# Patient Record
Sex: FEMALE | Race: BLACK OR AFRICAN AMERICAN | NOT HISPANIC OR LATINO | Employment: FULL TIME | URBAN - METROPOLITAN AREA
[De-identification: names, ages, dates, MRNs, and addresses within clinical notes are randomized per-mention and may not be internally consistent; named-entity substitution may affect disease eponyms.]

---

## 2022-10-19 ENCOUNTER — OFFICE VISIT (OUTPATIENT)
Dept: OPHTHALMOLOGY | Facility: CLINIC | Age: 47
End: 2022-10-19
Attending: STUDENT IN AN ORGANIZED HEALTH CARE EDUCATION/TRAINING PROGRAM
Payer: COMMERCIAL

## 2022-10-19 DIAGNOSIS — H43.393 VITREOUS SYNERESIS OF BOTH EYES: Primary | ICD-10-CM

## 2022-10-19 DIAGNOSIS — H35.412 LATTICE DEGENERATION, LEFT EYE: ICD-10-CM

## 2022-10-19 DIAGNOSIS — J30.2 SEASONAL ALLERGIES: ICD-10-CM

## 2022-10-19 DIAGNOSIS — H52.03 HYPEROPIA WITH PRESBYOPIA OF BOTH EYES: ICD-10-CM

## 2022-10-19 DIAGNOSIS — H52.4 HYPEROPIA WITH PRESBYOPIA OF BOTH EYES: ICD-10-CM

## 2022-10-19 DIAGNOSIS — H35.81 COTTON WOOL SPOTS: ICD-10-CM

## 2022-10-19 PROCEDURE — G0463 HOSPITAL OUTPT CLINIC VISIT: HCPCS

## 2022-10-19 PROCEDURE — 92004 COMPRE OPH EXAM NEW PT 1/>: CPT | Performed by: STUDENT IN AN ORGANIZED HEALTH CARE EDUCATION/TRAINING PROGRAM

## 2022-10-19 PROCEDURE — 92015 DETERMINE REFRACTIVE STATE: CPT

## 2022-10-19 ASSESSMENT — CONF VISUAL FIELD
OD_SUPERIOR_TEMPORAL_RESTRICTION: 0
OD_NORMAL: 1
OS_NORMAL: 1
OS_INFERIOR_TEMPORAL_RESTRICTION: 0
OS_SUPERIOR_NASAL_RESTRICTION: 0
METHOD: COUNTING FINGERS
OD_INFERIOR_NASAL_RESTRICTION: 0
OS_SUPERIOR_TEMPORAL_RESTRICTION: 0
OD_SUPERIOR_NASAL_RESTRICTION: 0
OD_INFERIOR_TEMPORAL_RESTRICTION: 0
OS_INFERIOR_NASAL_RESTRICTION: 0

## 2022-10-19 ASSESSMENT — EXTERNAL EXAM - LEFT EYE: OS_EXAM: WNL

## 2022-10-19 ASSESSMENT — VISUAL ACUITY
OD_SC: 20/50
OS_PH_SC: 20/40
METHOD: SNELLEN - LINEAR
OD_PH_SC: 20/30
OD_PH_SC+: -1
OS_PH_SC+: +2
OS_SC: 20/70
OD_SC+: +2

## 2022-10-19 ASSESSMENT — SLIT LAMP EXAM - LIDS
COMMENTS: WNL
COMMENTS: WNL

## 2022-10-19 ASSESSMENT — TONOMETRY
OS_IOP_MMHG: 19
OD_IOP_MMHG: 20
IOP_METHOD: TONOPEN

## 2022-10-19 ASSESSMENT — EXTERNAL EXAM - RIGHT EYE: OD_EXAM: WNL

## 2022-10-19 ASSESSMENT — REFRACTION_MANIFEST
OD_SPHERE: +1.00
OS_CYLINDER: SPHERE
OS_ADD: +1.25
OD_CYLINDER: SPHERE
OS_SPHERE: +1.50
OD_ADD: +1.25

## 2022-10-19 ASSESSMENT — CUP TO DISC RATIO
OD_RATIO: 0.45
OS_RATIO: 0.3

## 2022-10-19 NOTE — NURSING NOTE
Chief Complaints and History of Present Illnesses   Patient presents with     Annual Eye Exam     Chief Complaint(s) and History of Present Illness(es)     Annual Eye Exam           Comments    Pt C/o blurry vision distance and near  Pt states she has had flashes and floaters for years  No eye pain or redness    Luna Hernandez COT 7:30 AM October 19, 2022

## 2022-10-19 NOTE — PROGRESS NOTES
"HPI    Pt C/o blurry vision distance and near  Pt states she has had flashes and floaters for years  No eye pain or redness    Luna Hernandez COT 7:30 AM October 19, 2022       Last edited by Luna Hernandez on 10/19/2022  7:30 AM.          Review of systems for the eyes was negative other than the pertinent positives/negatives listed in the HPI.    Ocular Meds: none    Ocular Hx: none    FOHx: no family history of glaucoma or blindness    PMHx: no T2DM    Assessment & Plan      Lashaun Gamboa is a 47 year old female with the following diagnoses:    1. Vitreous syneresis of both eyes    2. Lattice degeneration, left eye    3. Seasonal allergies    4. Cotton wool spots    5. Hyperopia with presbyopia of both eyes       Vitreous Syneresis of both eyes  Lattice Degeneration left eye  - no holes, tears, or detachment, janelle's negative OU  - counseled RD precautions    Seasonal Allergies  - seasonal allergies and intermittent itchiness of eyes;   - will Rx Ketotifen BID PRN itching    Cotton Wool Spot OD  - history of hypertension; no headaches or change in vision; notes BP being \"high\" several months ago; will notify patient's PCP if there is more work up needed to be pursued  - advised to routinely check BP at home and notify PCP if elevated BP noted    Hyperopia with presbyopia of both eyes  - New MRx for glasses dispensed today, however, discussed with patient she could use over the counter readers at different power for distance vs near vision need    Patient disposition:   Return in about 1 year (around 10/19/2023) for Annual Visit, or sooner changes.      Attending Physician Attestation:  Complete documentation of historical and exam elements from today's encounter can be found in the full encounter summary report (not reduplicated in this progress note).  I personally obtained the chief complaint(s) and history of present illness.  I confirmed and edited as necessary the review of systems, past medical/surgical " history, family history, social history, and examination findings as documented by others; and I examined the patient myself.  I personally reviewed the relevant tests, images, and reports as documented above.  I formulated and edited as necessary the assessment and plan and discussed the findings and management plan with the patient and family. . - Karen Finch MD

## 2022-10-19 NOTE — LETTER
"10/19/2022       RE: Lashaun Gamboa  [address] [place]  [facility] [address]     Dear Colleague,    Thank you for referring your patient, Lashaun Gamboa, to the [facility] [place] at [facility]. Please see a copy of my visit note below.    HPI    Pt C/o blurry vision distance and near  Pt states she has had flashes and floaters for years  No eye pain or redness    Luna Hernandez COT 7:30 AM October 19, 2022       Last edited by Luna Hernandez on 10/19/2022  7:30 AM.          Review of systems for the eyes was negative other than the pertinent positives/negatives listed in the HPI.    Ocular Meds: none    Ocular Hx: none    FOHx: no family history of glaucoma or blindness    PMHx: no T2DM    Assessment & Plan     Lashaun Gamboa is a 47 year old female with the following diagnoses:    1. Vitreous syneresis of both eyes    2. Lattice degeneration, left eye    3. Seasonal allergies    4. Cotton wool spots    5. Hyperopia with presbyopia of both eyes       Vitreous Syneresis of both eyes  Lattice Degeneration left eye  - no holes, tears, or detachment, janelle's negative OU  - counseled RD precautions    Seasonal Allergies  - seasonal allergies and intermittent itchiness of eyes;   - will Rx Ketotifen BID PRN itching    Cotton Wool Spot OD  - history of hypertension; no headaches or change in vision; notes BP being \"high\" several months ago; will notify patient's PCP if there is more work up needed to be pursued  - advised to routinely check BP at home and notify PCP if elevated BP noted    Hyperopia with presbyopia of both eyes  - New MRx for glasses dispensed today, however, discussed with patient she could use over the counter readers at different power for distance vs near vision need    Patient disposition:   Return in about 1 year (around 10/19/2023) for Annual Visit, or sooner changes.      Attending Physician Attestation:  Complete documentation of " historical and exam elements from today's encounter can be found in the full encounter summary report (not reduplicated in this progress note).  I personally obtained the chief complaint(s) and history of present illness.  I confirmed and edited as necessary the review of systems, past medical/surgical history, family history, social history, and examination findings as documented by others; and I examined the patient myself.  I personally reviewed the relevant tests, images, and reports as documented above.  I formulated and edited as necessary the assessment and plan and discussed the findings and management plan with the patient and family. . - Karen Finch MD

## 2023-08-09 ENCOUNTER — OFFICE VISIT (OUTPATIENT)
Dept: OPHTHALMOLOGY | Facility: CLINIC | Age: 48
End: 2023-08-09
Attending: STUDENT IN AN ORGANIZED HEALTH CARE EDUCATION/TRAINING PROGRAM
Payer: COMMERCIAL

## 2023-08-09 DIAGNOSIS — H52.4 HYPEROPIA WITH PRESBYOPIA OF BOTH EYES: ICD-10-CM

## 2023-08-09 DIAGNOSIS — H43.393 VITREOUS SYNERESIS OF BOTH EYES: ICD-10-CM

## 2023-08-09 DIAGNOSIS — H52.03 HYPEROPIA WITH PRESBYOPIA OF BOTH EYES: ICD-10-CM

## 2023-08-09 DIAGNOSIS — H53.8 BLURRY VISION, BILATERAL: Primary | ICD-10-CM

## 2023-08-09 DIAGNOSIS — J30.2 SEASONAL ALLERGIES: ICD-10-CM

## 2023-08-09 DIAGNOSIS — H35.412 LATTICE DEGENERATION, LEFT EYE: ICD-10-CM

## 2023-08-09 PROCEDURE — 92134 CPTRZ OPH DX IMG PST SGM RTA: CPT | Performed by: STUDENT IN AN ORGANIZED HEALTH CARE EDUCATION/TRAINING PROGRAM

## 2023-08-09 PROCEDURE — 92133 CPTRZD OPH DX IMG PST SGM ON: CPT | Performed by: STUDENT IN AN ORGANIZED HEALTH CARE EDUCATION/TRAINING PROGRAM

## 2023-08-09 PROCEDURE — 99207 OCT OPTIC NERVE RNFL SPECTRALIS OU (BOTH EYES): CPT | Mod: 26 | Performed by: STUDENT IN AN ORGANIZED HEALTH CARE EDUCATION/TRAINING PROGRAM

## 2023-08-09 PROCEDURE — G0463 HOSPITAL OUTPT CLINIC VISIT: HCPCS | Performed by: STUDENT IN AN ORGANIZED HEALTH CARE EDUCATION/TRAINING PROGRAM

## 2023-08-09 PROCEDURE — 99214 OFFICE O/P EST MOD 30 MIN: CPT | Performed by: STUDENT IN AN ORGANIZED HEALTH CARE EDUCATION/TRAINING PROGRAM

## 2023-08-09 ASSESSMENT — VISUAL ACUITY
OS_SC: 20/60
OD_PH_SC: 20/40
METHOD: SNELLEN - LINEAR
OD_SC: 20/60

## 2023-08-09 ASSESSMENT — CUP TO DISC RATIO
OS_RATIO: 0.3
OD_RATIO: 0.45

## 2023-08-09 ASSESSMENT — CONF VISUAL FIELD
OS_SUPERIOR_NASAL_RESTRICTION: 0
OD_NORMAL: 1
OD_SUPERIOR_TEMPORAL_RESTRICTION: 0
OS_INFERIOR_TEMPORAL_RESTRICTION: 0
OD_INFERIOR_NASAL_RESTRICTION: 0
OD_INFERIOR_TEMPORAL_RESTRICTION: 0
OS_SUPERIOR_TEMPORAL_RESTRICTION: 0
OS_INFERIOR_NASAL_RESTRICTION: 0
OD_SUPERIOR_NASAL_RESTRICTION: 0
OS_NORMAL: 1

## 2023-08-09 ASSESSMENT — EXTERNAL EXAM - RIGHT EYE: OD_EXAM: WNL

## 2023-08-09 ASSESSMENT — REFRACTION_MANIFEST
OD_SPHERE: +0.75
OD_ADD: +1.25
OS_ADD: +1.25
OS_SPHERE: +1.25

## 2023-08-09 ASSESSMENT — TONOMETRY
OS_IOP_MMHG: 22
OD_IOP_MMHG: 19
IOP_METHOD: TONOPEN

## 2023-08-09 ASSESSMENT — SLIT LAMP EXAM - LIDS
COMMENTS: WNL
COMMENTS: WNL

## 2023-08-09 ASSESSMENT — EXTERNAL EXAM - LEFT EYE: OS_EXAM: WNL
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
## 2023-09-06 ENCOUNTER — OFFICE VISIT (OUTPATIENT)
Dept: OPHTHALMOLOGY | Facility: CLINIC | Age: 48
End: 2023-09-06
Attending: STUDENT IN AN ORGANIZED HEALTH CARE EDUCATION/TRAINING PROGRAM
Payer: COMMERCIAL

## 2023-09-06 DIAGNOSIS — J30.2 SEASONAL ALLERGIES: ICD-10-CM

## 2023-09-06 DIAGNOSIS — H43.393 VITREOUS SYNERESIS OF BOTH EYES: ICD-10-CM

## 2023-09-06 DIAGNOSIS — H04.123 DRY EYES, BILATERAL: ICD-10-CM

## 2023-09-06 DIAGNOSIS — H35.412 LATTICE DEGENERATION, LEFT EYE: ICD-10-CM

## 2023-09-06 DIAGNOSIS — H52.7 REFRACTIVE ERROR: ICD-10-CM

## 2023-09-06 DIAGNOSIS — H53.40 VISUAL FIELD DEFECT: ICD-10-CM

## 2023-09-06 DIAGNOSIS — H53.8 BLURRY VISION, BILATERAL: Primary | ICD-10-CM

## 2023-09-06 DIAGNOSIS — H10.13 ALLERGIC CONJUNCTIVITIS OF BOTH EYES: ICD-10-CM

## 2023-09-06 PROCEDURE — G0463 HOSPITAL OUTPT CLINIC VISIT: HCPCS | Performed by: STUDENT IN AN ORGANIZED HEALTH CARE EDUCATION/TRAINING PROGRAM

## 2023-09-06 PROCEDURE — 92083 EXTENDED VISUAL FIELD XM: CPT | Performed by: STUDENT IN AN ORGANIZED HEALTH CARE EDUCATION/TRAINING PROGRAM

## 2023-09-06 PROCEDURE — 99213 OFFICE O/P EST LOW 20 MIN: CPT | Mod: GC | Performed by: STUDENT IN AN ORGANIZED HEALTH CARE EDUCATION/TRAINING PROGRAM

## 2023-09-06 RX ORDER — OLOPATADINE HYDROCHLORIDE 2 MG/ML
1 SOLUTION/ DROPS OPHTHALMIC DAILY
Qty: 2.5 ML | Refills: 11 | Status: SHIPPED | OUTPATIENT
Start: 2023-09-06

## 2023-09-06 ASSESSMENT — CONF VISUAL FIELD
OS_INFERIOR_NASAL_RESTRICTION: 3
OD_INFERIOR_TEMPORAL_RESTRICTION: 0
OS_SUPERIOR_NASAL_RESTRICTION: 0
OS_INFERIOR_TEMPORAL_RESTRICTION: 0
OD_SUPERIOR_NASAL_RESTRICTION: 3
OD_SUPERIOR_TEMPORAL_RESTRICTION: 0
OS_SUPERIOR_TEMPORAL_RESTRICTION: 0
METHOD: COUNTING FINGERS
OD_INFERIOR_NASAL_RESTRICTION: 3

## 2023-09-06 ASSESSMENT — EXTERNAL EXAM - LEFT EYE: OS_EXAM: WNL

## 2023-09-06 ASSESSMENT — REFRACTION_MANIFEST
OS_CYLINDER: SPHERE
OD_CYLINDER: SPHERE
OS_SPHERE: +1.75
OD_ADD: +1.75
OS_ADD: +1.75
OD_SPHERE: +1.25

## 2023-09-06 ASSESSMENT — EXTERNAL EXAM - RIGHT EYE: OD_EXAM: WNL

## 2023-09-06 ASSESSMENT — VISUAL ACUITY
METHOD: NUMBERS - LINEAR
OD_SC+: -1
METHOD_MR_RETINOSCOPY: 1
OS_SC: 20/125
OD_BAT_HIGH: 20/25-2
OS_SC+: -1
OD_SC: 20/80
OS_BAT_HIGH: 20/25-2

## 2023-09-06 ASSESSMENT — TONOMETRY
OD_IOP_MMHG: 12
OS_IOP_MMHG: 13
IOP_METHOD: TONOPEN

## 2023-09-06 ASSESSMENT — SLIT LAMP EXAM - LIDS
COMMENTS: WNL
COMMENTS: WNL

## 2023-09-06 NOTE — NURSING NOTE
Chief Complaints and History of Present Illnesses   Patient presents with    Follow Up     Chief Complaint(s) and History of Present Illness(es)       Follow Up              Laterality: both eyes    Course: gradually worsening    Associated symptoms: eye pain, flashes and floaters              Comments    Here for follow up. Had a seizure 2.5 weeks ago and VA has worsened since. Missed MRI appointment and rescheduled for next week.  Intermittent flashes and floaters. Mild eye pain.    Lowell Mendoza COT 8:57 AM September 6, 2023
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
## 2023-10-16 DIAGNOSIS — H53.8 BLURRY VISION, BILATERAL: ICD-10-CM

## 2023-10-16 DIAGNOSIS — H53.40 VISUAL FIELD DEFECT: Primary | ICD-10-CM

## 2023-10-18 ENCOUNTER — OFFICE VISIT (OUTPATIENT)
Dept: OPHTHALMOLOGY | Facility: CLINIC | Age: 48
End: 2023-10-18
Attending: OPHTHALMOLOGY
Payer: COMMERCIAL

## 2023-10-18 DIAGNOSIS — H53.8 BLURRY VISION, BILATERAL: ICD-10-CM

## 2023-10-18 DIAGNOSIS — H40.003 GLAUCOMA SUSPECT OF BOTH EYES: Primary | ICD-10-CM

## 2023-10-18 DIAGNOSIS — H53.40 VISUAL FIELD DEFECT: ICD-10-CM

## 2023-10-18 PROCEDURE — 76514 ECHO EXAM OF EYE THICKNESS: CPT | Performed by: OPHTHALMOLOGY

## 2023-10-18 PROCEDURE — 92133 CPTRZD OPH DX IMG PST SGM ON: CPT | Performed by: OPHTHALMOLOGY

## 2023-10-18 ASSESSMENT — CONF VISUAL FIELD
METHOD: COUNTING FINGERS
OD_INFERIOR_TEMPORAL_RESTRICTION: 3
OS_SUPERIOR_NASAL_RESTRICTION: 3
OD_SUPERIOR_TEMPORAL_RESTRICTION: 3

## 2023-10-18 ASSESSMENT — REFRACTION_WEARINGRX
OS_CYLINDER: SPHERE
SPECS_TYPE: SVL/BF/PAL
OD_CYLINDER: SPHERE
OD_ADD: +1.75
OD_SPHERE: +1.25
OS_SPHERE: +1.75
OS_ADD: +1.75

## 2023-10-18 ASSESSMENT — TONOMETRY
OS_IOP_MMHG: 16
IOP_METHOD: TONOPEN
OD_IOP_MMHG: 18

## 2023-10-18 ASSESSMENT — VISUAL ACUITY
OD_CC: 20/20
OS_CC: 20/20
CORRECTION_TYPE: GLASSES
METHOD: SNELLEN - LINEAR

## 2023-10-18 ASSESSMENT — PACHYMETRY
OS_CT(UM): 550
OD_CT(UM): 550

## 2023-10-18 NOTE — PROGRESS NOTES
Left prior to seeing staff during this visit, rescheduled     Jb Hernandez MD  , Comprehensive Ophthalmology  Department of Ophthalmology and Visual Neurosciences  [facility]

## 2023-10-18 NOTE — NURSING NOTE
Chief Complaints and History of Present Illnesses   Patient presents with    Consult For     Glaucoma suspect, referred by Dr Finch     Chief Complaint(s) and History of Present Illness(es)       Consult For              Laterality: both eyes    Course: stable    Associated symptoms: dryness, headache and floaters (right eye, stable).  Negative for eye pain    Treatments tried: artificial tears    Pain scale: 4/10    Comments: Glaucoma suspect, referred by Dr Finch              Comments    She tells me that her right eye has blurred temporal peripheral vision, which seems stable since her last exam.  She sees better with her left eye.  Both eyes seem intermittently dry.  Using artificial tears does help the discomfort.     Michelle Pretty, COT 9:38 AM  October 18, 2023

## 2023-12-27 ENCOUNTER — OFFICE VISIT (OUTPATIENT)
Dept: OPHTHALMOLOGY | Facility: CLINIC | Age: 48
End: 2023-12-27
Attending: OPHTHALMOLOGY
Payer: COMMERCIAL

## 2023-12-27 DIAGNOSIS — H40.003 GLAUCOMA SUSPECT OF BOTH EYES: Primary | ICD-10-CM

## 2023-12-27 DIAGNOSIS — H04.123 DRY EYES, BILATERAL: ICD-10-CM

## 2023-12-27 PROCEDURE — 99213 OFFICE O/P EST LOW 20 MIN: CPT | Performed by: OPHTHALMOLOGY

## 2023-12-27 PROCEDURE — G0463 HOSPITAL OUTPT CLINIC VISIT: HCPCS | Performed by: OPHTHALMOLOGY

## 2023-12-27 PROCEDURE — 92133 CPTRZD OPH DX IMG PST SGM ON: CPT | Performed by: OPHTHALMOLOGY

## 2023-12-27 PROCEDURE — 92083 EXTENDED VISUAL FIELD XM: CPT | Performed by: OPHTHALMOLOGY

## 2023-12-27 ASSESSMENT — REFRACTION_WEARINGRX
OD_SPHERE: +1.25
OD_ADD: +1.75
OD_CYLINDER: SPHERE
OS_SPHERE: +1.75
OS_CYLINDER: SPHERE
SPECS_TYPE: SVL/BF/PAL
OS_ADD: +1.75

## 2023-12-27 ASSESSMENT — VISUAL ACUITY
OD_CC: 20/20
OS_CC: 20/20
CORRECTION_TYPE: GLASSES
METHOD: SNELLEN - LINEAR

## 2023-12-27 ASSESSMENT — EXTERNAL EXAM - LEFT EYE: OS_EXAM: NORMAL

## 2023-12-27 ASSESSMENT — SLIT LAMP EXAM - LIDS
COMMENTS: NORMAL
COMMENTS: NORMAL

## 2023-12-27 ASSESSMENT — EXTERNAL EXAM - RIGHT EYE: OD_EXAM: NORMAL

## 2023-12-27 ASSESSMENT — TONOMETRY
OD_IOP_MMHG: 21
IOP_METHOD: TONOPEN
OS_IOP_MMHG: 23

## 2023-12-27 NOTE — NURSING NOTE
Chief Complaints and History of Present Illnesses   Patient presents with    Glaucoma Suspect Follow Up     Chief Complaint(s) and History of Present Illness(es)       Glaucoma Suspect Follow Up              Laterality: both eyes              Comments    Pt. States that VA is still blurry at times. Still having occasional headaches. No dryness BE. Still seeing the same flashes and floaters BE.   Laura De La Rosa COT 10:31 AM December 27, 2023
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
## 2025-01-08 ENCOUNTER — OFFICE VISIT (OUTPATIENT)
Dept: OPHTHALMOLOGY | Facility: CLINIC | Age: 50
End: 2025-01-08
Attending: OPHTHALMOLOGY
Payer: COMMERCIAL

## 2025-01-08 DIAGNOSIS — H40.003 GLAUCOMA SUSPECT OF BOTH EYES: Primary | ICD-10-CM

## 2025-01-08 DIAGNOSIS — H25.13 NUCLEAR AGE-RELATED CATARACT, BOTH EYES: ICD-10-CM

## 2025-01-08 DIAGNOSIS — H43.393 FLOATERS IN VISUAL FIELD, BILATERAL: ICD-10-CM

## 2025-01-08 DIAGNOSIS — H04.123 DRY EYES, BILATERAL: ICD-10-CM

## 2025-01-08 DIAGNOSIS — H52.03 HYPEROPIA OF BOTH EYES: ICD-10-CM

## 2025-01-08 PROCEDURE — 92133 CPTRZD OPH DX IMG PST SGM ON: CPT | Performed by: OPHTHALMOLOGY

## 2025-01-08 PROCEDURE — G0463 HOSPITAL OUTPT CLINIC VISIT: HCPCS | Performed by: OPHTHALMOLOGY

## 2025-01-08 PROCEDURE — 92015 DETERMINE REFRACTIVE STATE: CPT

## 2025-01-08 ASSESSMENT — EXTERNAL EXAM - LEFT EYE: OS_EXAM: NORMAL

## 2025-01-08 ASSESSMENT — REFRACTION_MANIFEST
OD_ADD: +2.00
OD_SPHERE: +0.75
OS_ADD: +2.00
OS_SPHERE: +1.75
OS_CYLINDER: SPHERE
OD_CYLINDER: SPHERE

## 2025-01-08 ASSESSMENT — REFRACTION_WEARINGRX
OD_ADD: +1.75
SPECS_TYPE: SVL/BF/PAL
OD_SPHERE: +1.25
OS_ADD: +1.75
OD_CYLINDER: SPHERE
OS_CYLINDER: SPHERE
OS_SPHERE: +1.75

## 2025-01-08 ASSESSMENT — CUP TO DISC RATIO
OD_RATIO: 0.4
OS_RATIO: 0.25

## 2025-01-08 ASSESSMENT — VISUAL ACUITY
METHOD: SNELLEN - LINEAR
OD_CC: 20/25
OS_CC: 20/25
CORRECTION_TYPE: GLASSES

## 2025-01-08 ASSESSMENT — SLIT LAMP EXAM - LIDS
COMMENTS: NORMAL
COMMENTS: NORMAL

## 2025-01-08 ASSESSMENT — TONOMETRY
IOP_METHOD: TONOPEN
OS_IOP_MMHG: 13
OD_IOP_MMHG: 14

## 2025-01-08 ASSESSMENT — CONF VISUAL FIELD
OS_INFERIOR_NASAL_RESTRICTION: 3
OD_INFERIOR_NASAL_RESTRICTION: 3

## 2025-01-08 ASSESSMENT — EXTERNAL EXAM - RIGHT EYE: OD_EXAM: NORMAL
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
## 2025-01-08 NOTE — NURSING NOTE
Chief Complaints and History of Present Illnesses   Patient presents with    Glaucoma Suspect Follow Up     Chief Complaint(s) and History of Present Illness(es)       Glaucoma Suspect Follow Up               Comments    Pt. States that floaters have worsened BE. Have been staying in central vision and making things blurry at times. Would like to discuss possible procedure to remove floaters. No flashes or floaters BE.   No pain BE.  Laura De La Rosa COT 10:13 AM January 8, 2025

## 2025-05-21 ENCOUNTER — TELEPHONE (OUTPATIENT)
Dept: OPHTHALMOLOGY | Facility: CLINIC | Age: 50
End: 2025-05-21
Payer: COMMERCIAL

## 2025-07-30 ENCOUNTER — APPOINTMENT (OUTPATIENT)
Dept: URBAN - METROPOLITAN AREA CLINIC 254 | Age: 50
Setting detail: DERMATOLOGY
End: 2025-07-30

## 2025-07-30 VITALS — HEIGHT: 67 IN | WEIGHT: 122 LBS

## 2025-07-30 DIAGNOSIS — L21.8 OTHER SEBORRHEIC DERMATITIS: ICD-10-CM

## 2025-07-30 DIAGNOSIS — R21 RASH AND OTHER NONSPECIFIC SKIN ERUPTION: ICD-10-CM

## 2025-07-30 PROCEDURE — 11104 PUNCH BX SKIN SINGLE LESION: CPT

## 2025-07-30 PROCEDURE — OTHER PRESCRIPTION MEDICATION MANAGEMENT: OTHER

## 2025-07-30 PROCEDURE — 99204 OFFICE O/P NEW MOD 45 MIN: CPT | Mod: 25

## 2025-07-30 PROCEDURE — OTHER PRESCRIPTION: OTHER

## 2025-07-30 PROCEDURE — OTHER BIOPSY BY PUNCH METHOD: OTHER

## 2025-07-30 PROCEDURE — OTHER COUNSELING: OTHER

## 2025-07-30 PROCEDURE — 11105 PUNCH BX SKIN EA SEP/ADDL: CPT

## 2025-07-30 PROCEDURE — OTHER SEPARATE AND IDENTIFIABLE DOCUMENTATION: OTHER

## 2025-07-30 RX ORDER — CLOTRIMAZOLE AND BETAMETHASONE DIPROPIONATE 10; .5 MG/G; MG/G
CREAM TOPICAL BID
Qty: 45 | Refills: 0 | Status: ERX | COMMUNITY
Start: 2025-07-30

## 2025-07-30 RX ORDER — KETOCONAZOLE 20 MG/ML
2% SHAMPOO, SUSPENSION TOPICAL
Qty: 120 | Refills: 1 | Status: ERX | COMMUNITY
Start: 2025-07-30

## 2025-07-30 ASSESSMENT — LOCATION DETAILED DESCRIPTION DERM
LOCATION DETAILED: LEFT MEDIAL PLANTAR MIDFOOT
LOCATION DETAILED: LEFT PROXIMAL CALF
LOCATION DETAILED: LEFT BUTTOCK
LOCATION DETAILED: RIGHT THENAR EMINENCE
LOCATION DETAILED: RIGHT MEDIAL FRONTAL SCALP
LOCATION DETAILED: LEFT THENAR EMINENCE
LOCATION DETAILED: RIGHT DORSAL WRIST
LOCATION DETAILED: RIGHT INFERIOR MEDIAL UPPER BACK
LOCATION DETAILED: PERIUMBILICAL SKIN
LOCATION DETAILED: RIGHT VENTRAL PROXIMAL FOREARM
LOCATION DETAILED: RIGHT MEDIAL PLANTAR MIDFOOT

## 2025-07-30 ASSESSMENT — LOCATION SIMPLE DESCRIPTION DERM
LOCATION SIMPLE: RIGHT PLANTAR SURFACE
LOCATION SIMPLE: LEFT PLANTAR SURFACE
LOCATION SIMPLE: RIGHT SCALP
LOCATION SIMPLE: ABDOMEN
LOCATION SIMPLE: RIGHT FOREARM
LOCATION SIMPLE: RIGHT HAND
LOCATION SIMPLE: RIGHT WRIST
LOCATION SIMPLE: LEFT HAND
LOCATION SIMPLE: LEFT BUTTOCK
LOCATION SIMPLE: LEFT CALF
LOCATION SIMPLE: RIGHT UPPER BACK

## 2025-07-30 ASSESSMENT — LOCATION ZONE DERM
LOCATION ZONE: LEG
LOCATION ZONE: FEET
LOCATION ZONE: HAND
LOCATION ZONE: ARM
LOCATION ZONE: TRUNK
LOCATION ZONE: SCALP

## 2025-08-06 ENCOUNTER — APPOINTMENT (OUTPATIENT)
Dept: URBAN - METROPOLITAN AREA CLINIC 254 | Age: 50
Setting detail: DERMATOLOGY
End: 2025-08-07

## 2025-08-06 VITALS — HEIGHT: 67 IN | WEIGHT: 125 LBS

## 2025-08-06 DIAGNOSIS — L40.4 GUTTATE PSORIASIS: ICD-10-CM

## 2025-08-06 DIAGNOSIS — Z48.02 ENCOUNTER FOR REMOVAL OF SUTURES: ICD-10-CM

## 2025-08-06 PROCEDURE — OTHER PRESCRIPTION: OTHER

## 2025-08-06 PROCEDURE — OTHER ADDITIONAL NOTES: OTHER

## 2025-08-06 PROCEDURE — 99213 OFFICE O/P EST LOW 20 MIN: CPT

## 2025-08-06 PROCEDURE — OTHER SUTURE REMOVAL (NO GLOBAL PERIOD): OTHER

## 2025-08-06 PROCEDURE — OTHER COUNSELING: OTHER

## 2025-08-06 PROCEDURE — OTHER PRESCRIPTION MEDICATION MANAGEMENT: OTHER

## 2025-08-06 PROCEDURE — OTHER NARROW BAND UVB ORDER: OTHER

## 2025-08-06 RX ORDER — MUPIROCIN 2 %
2% OINTMENT (GRAM) TOPICAL BID
Qty: 15 | Refills: 0 | Status: ERX | COMMUNITY
Start: 2025-08-06

## 2025-08-06 RX ORDER — TRIAMCINOLONE ACETONIDE 1 MG/G
0.1% OINTMENT TOPICAL BID
Qty: 453.6 | Refills: 1 | Status: ERX | COMMUNITY
Start: 2025-08-06

## 2025-08-06 ASSESSMENT — LOCATION DETAILED DESCRIPTION DERM
LOCATION DETAILED: LEFT BUTTOCK
LOCATION DETAILED: LEFT THENAR EMINENCE
LOCATION DETAILED: LEFT PROXIMAL CALF
LOCATION DETAILED: RIGHT DORSAL WRIST
LOCATION DETAILED: RIGHT THENAR EMINENCE
LOCATION DETAILED: LEFT DISTAL POSTERIOR THIGH
LOCATION DETAILED: RIGHT LABIUM MAJUS
LOCATION DETAILED: EPIGASTRIC SKIN
LOCATION DETAILED: RIGHT VENTRAL PROXIMAL FOREARM
LOCATION DETAILED: RIGHT MEDIAL PLANTAR MIDFOOT
LOCATION DETAILED: LEFT LABIUM MAJUS
LOCATION DETAILED: LEFT MEDIAL PLANTAR MIDFOOT
LOCATION DETAILED: PERIUMBILICAL SKIN
LOCATION DETAILED: INFERIOR THORACIC SPINE
LOCATION DETAILED: RIGHT INFERIOR MEDIAL UPPER BACK

## 2025-08-06 ASSESSMENT — LOCATION SIMPLE DESCRIPTION DERM
LOCATION SIMPLE: UPPER BACK
LOCATION SIMPLE: LEFT PLANTAR SURFACE
LOCATION SIMPLE: ABDOMEN
LOCATION SIMPLE: RIGHT HAND
LOCATION SIMPLE: RIGHT PLANTAR SURFACE
LOCATION SIMPLE: RIGHT WRIST
LOCATION SIMPLE: LABIA MAJORA
LOCATION SIMPLE: RIGHT FOREARM
LOCATION SIMPLE: LEFT BUTTOCK
LOCATION SIMPLE: RIGHT UPPER BACK
LOCATION SIMPLE: LEFT CALF
LOCATION SIMPLE: LEFT POSTERIOR THIGH
LOCATION SIMPLE: LEFT HAND

## 2025-08-06 ASSESSMENT — LOCATION ZONE DERM
LOCATION ZONE: TRUNK
LOCATION ZONE: ARM
LOCATION ZONE: LEG
LOCATION ZONE: VULVA
LOCATION ZONE: HAND
LOCATION ZONE: FEET

## 2025-08-06 ASSESSMENT — PGA PSORIASIS: PGA PSORIASIS 2020: MODERATE

## 2025-08-06 ASSESSMENT — BSA PSORIASIS: % BODY COVERED IN PSORIASIS: 30

## 2025-08-11 ENCOUNTER — RX ONLY (RX ONLY)
Age: 50
End: 2025-08-11

## 2025-08-11 RX ORDER — MUPIROCIN 20 MG/G
2% OINTMENT TOPICAL BID
Qty: 22 | Refills: 0 | Status: ERX | COMMUNITY
Start: 2025-08-11